# Patient Record
(demographics unavailable — no encounter records)

---

## 2017-06-20 NOTE — ED
General Adult HPI





- General


Source: patient, RN notes reviewed


Mode of arrival: wheelchair


Limitations: no limitations





<Nickolas Brown - Last Filed: 06/20/17 00:27>





<Rogers Weeks - Last Filed: 06/20/17 03:58>





- General


Chief complaint: Chest Pain


Stated complaint: Chest Pain/Leg Swelling


Time Seen by Provider: 06/20/17 00:05





- History of Present Illness


Initial comments: 





This is a 48-year-old female who presents to emergency department complaining 

of chest pain or shortness of breath since noon.  Patient states the pain 

radiates around to her back.  Patient states the pain in her chest is a 

whirling sensation.  Patient denies any diaphoresis.  Patient denies any 

nausea.  Patient states he has had an increase in edema to both legs.  Patient 

denies any calf pain.  Patient denies any lightheadedness or dizziness.  

Patient denies any patient denies numbness weakness.  Patient denies any fever 

chills or cough. (Nickolas Brown)





- Related Data


 Home Medications











 Medication  Instructions  Recorded  Confirmed


 


Butalb/Acetaminophen/Caffeine 1 cap PO 06/20/17 





[Esgic -40 Capsule]   


 


Furosemide [Lasix] 20 mg PO DAILY 06/20/17 06/20/17


 


Naproxen 500 mg PO 06/20/17 


 


OXcarbazepine [Trileptal] 300 mg PO BID 06/20/17 06/20/17


 


Sertraline [Zoloft] 50 mg PO DAILY 06/20/17 06/20/17


 


risperiDONE [RisperDAL] 1 mg PO DAILY 06/20/17 06/20/17


 


traMADol HCL [Ultram]  06/20/17 











 Allergies











Allergy/AdvReac Type Severity Reaction Status Date / Time


 


aspirin Allergy  Vomiting Verified 06/20/17 00:06


 


clarithromycin [From Biaxin] Allergy  Vomiting Verified 06/20/17 00:06


 


Penicillins Allergy  Dyspnea Verified 06/20/17 00:06














Review of Systems


ROS Other: All systems not noted in ROS Statement are negative.





<Nickolas Brown - Last Filed: 06/20/17 00:27>


ROS Other: All systems not noted in ROS Statement are negative.





<Rogers Weeks - Last Filed: 06/20/17 03:58>


ROS Statement: 


Those systems with pertinent positive or pertinent negative responses have been 

documented in the HPI.








Past Medical History


Past Medical History: Hypertension


Additional Past Medical History / Comment(s): chronic back pain, leg edema, 

scoliosis


History of Any Multi-Drug Resistant Organisms: None Reported


Past Surgical History: Orthopedic Surgery


Additional Past Surgical History / Comment(s): knee sx


Past Psychological History: Anxiety


Smoking Status: Current every day smoker


Past Alcohol Use History: Rare


Past Drug Use History: None Reported





<Nickolas Brown - Last Filed: 06/20/17 00:27>





General Exam


Limitations: no limitations





<Nickolas Brown - Last Filed: 06/20/17 00:27>





<Rogers Weeks - Last Filed: 06/20/17 03:58>





- General Exam Comments


Initial Comments: 





GENERAL:


Patient is well-developed and well-nourished.  Patient is nontoxic and well-

hydrated and is in mild distress.





ENT:


Neck is soft and supple.  No significant lymphadenopathy is noted.  Oropharynx 

is clear.  Moist mucous membranes.  Neck has full range of motion without 

eliciting any pain. 





EYES:


The sclera were anicteric and conjunctiva were pink and moist.  Extraocular 

movements were intact and pupils were equal round and reactive to light.  

Eyelids were unremarkable.





PULMONARY:


Unlabored respirations.  Good breath sounds bilaterally.  No audible rales 

rhonchi or wheezing was noted.





CARDIOVASCULAR:


There is a regular rate and rhythm without any murmurs gallops or rubs.  





ABDOMEN:


Soft and nontender with normal bowel sounds.  No palpable organomegaly was 

noted.  There is no palpable pulsatile mass.





SKIN:


Skin is clear with no lesions or rashes and otherwise unremarkable.





NEUROLOGIC:


Patient is alert and oriented x3.  Cranial nerves II through XII are grossly 

intact.  Motor and sensory are also intact.  Normal speech, volume and content.

  Symmetrical smile.  





MUSCULOSKELETAL:


Normal extremities with adequate strength and full range of motion.  1+ edema 

bilaterally





LYMPHATICS:


No significant lymphadenopathy is noted





PSYCHIATRIC:


Normal psychiatric evaluation.  Normal interpersonal interactions appears 

functionally intact in deals appropriately with others.  No signs of 

depression.  No signs of anxiety.  (Nickolas Brown)





Medical Decision Making





<Nickolas Brown - Last Filed: 06/20/17 00:27>





- Lab Data


Result diagrams: 


 06/20/17 00:30





 06/20/17 00:30





<Rogers Weeks - Last Filed: 06/20/17 03:58>





- Medical Decision Making





EKG shows a sinus bradycardia 55 bpm MO interval is 148 QRS is 90 QT intervals 

452 QTC is 432 patient's EKG shows no ST segment elevation or depression or T-

wave abdomen is noted.





Dr. Salgado taking over the care of this patient at 1 AM. (Nickolas Brown)





- Lab Data





 Lab Results











  06/20/17 06/20/17 06/20/17 Range/Units





  00:30 00:30 00:30 


 


WBC   6.0   (3.8-10.6)  k/uL


 


RBC   4.27   (3.80-5.40)  m/uL


 


Hgb   12.8   (11.4-16.0)  gm/dL


 


Hct   38.6   (34.0-46.0)  %


 


MCV   90.5   (80.0-100.0)  fL


 


MCH   30.1   (25.0-35.0)  pg


 


MCHC   33.2   (31.0-37.0)  g/dL


 


RDW   13.5   (11.5-15.5)  %


 


Plt Count   202   (150-450)  k/uL


 


Neutrophils %   48   %


 


Lymphocytes %   41   %


 


Monocytes %   5   %


 


Eosinophils %   3   %


 


Basophils %   1   %


 


Neutrophils #   2.9   (1.3-7.7)  k/uL


 


Lymphocytes #   2.5   (1.0-4.8)  k/uL


 


Monocytes #   0.3   (0-1.0)  k/uL


 


Eosinophils #   0.2   (0-0.7)  k/uL


 


Basophils #   0.1   (0-0.2)  k/uL


 


PT     (9.0-12.0)  sec


 


INR     (<1.1)  


 


APTT     (22.0-30.0)  sec


 


Sodium    124 L  (137-145)  mmol/L


 


Potassium    3.7  (3.5-5.1)  mmol/L


 


Chloride    91 L  ()  mmol/L


 


Carbon Dioxide    25  (22-30)  mmol/L


 


Anion Gap    8  mmol/L


 


BUN    10  (7-17)  mg/dL


 


Creatinine    0.60  (0.52-1.04)  mg/dL


 


Est GFR (MDRD) Af Amer    >60  (>60 ml/min/1.73 sqM)  


 


Est GFR (MDRD) Non-Af    >60  (>60 ml/min/1.73 sqM)  


 


Glucose    103 H  (74-99)  mg/dL


 


Calcium    8.6  (8.4-10.2)  mg/dL


 


Magnesium    1.7  (1.6-2.3)  mg/dL


 


Total Bilirubin    0.3  (0.2-1.3)  mg/dL


 


AST    22  (14-36)  U/L


 


ALT    32  (9-52)  U/L


 


Alkaline Phosphatase    77  ()  U/L


 


Total Creatine Kinase  196 H    ()  U/L


 


CK-MB (CK-2)  5.4 H*    (0.0-2.4)  ng/mL


 


CK-MB (CK-2) Rel Index  2.8    


 


Troponin I  <0.012    (0.000-0.034)  ng/mL


 


NT-Pro-B Natriuret Pep     pg/mL


 


Total Protein    6.0 L  (6.3-8.2)  g/dL


 


Albumin    3.8  (3.5-5.0)  g/dL














  06/20/17 06/20/17 Range/Units





  00:30 00:30 


 


WBC    (3.8-10.6)  k/uL


 


RBC    (3.80-5.40)  m/uL


 


Hgb    (11.4-16.0)  gm/dL


 


Hct    (34.0-46.0)  %


 


MCV    (80.0-100.0)  fL


 


MCH    (25.0-35.0)  pg


 


MCHC    (31.0-37.0)  g/dL


 


RDW    (11.5-15.5)  %


 


Plt Count    (150-450)  k/uL


 


Neutrophils %    %


 


Lymphocytes %    %


 


Monocytes %    %


 


Eosinophils %    %


 


Basophils %    %


 


Neutrophils #    (1.3-7.7)  k/uL


 


Lymphocytes #    (1.0-4.8)  k/uL


 


Monocytes #    (0-1.0)  k/uL


 


Eosinophils #    (0-0.7)  k/uL


 


Basophils #    (0-0.2)  k/uL


 


PT   10.2  (9.0-12.0)  sec


 


INR   1.0  (<1.1)  


 


APTT   25.0  (22.0-30.0)  sec


 


Sodium    (137-145)  mmol/L


 


Potassium    (3.5-5.1)  mmol/L


 


Chloride    ()  mmol/L


 


Carbon Dioxide    (22-30)  mmol/L


 


Anion Gap    mmol/L


 


BUN    (7-17)  mg/dL


 


Creatinine    (0.52-1.04)  mg/dL


 


Est GFR (MDRD) Af Amer    (>60 ml/min/1.73 sqM)  


 


Est GFR (MDRD) Non-Af    (>60 ml/min/1.73 sqM)  


 


Glucose    (74-99)  mg/dL


 


Calcium    (8.4-10.2)  mg/dL


 


Magnesium    (1.6-2.3)  mg/dL


 


Total Bilirubin    (0.2-1.3)  mg/dL


 


AST    (14-36)  U/L


 


ALT    (9-52)  U/L


 


Alkaline Phosphatase    ()  U/L


 


Total Creatine Kinase    ()  U/L


 


CK-MB (CK-2)    (0.0-2.4)  ng/mL


 


CK-MB (CK-2) Rel Index    


 


Troponin I    (0.000-0.034)  ng/mL


 


NT-Pro-B Natriuret Pep  292   pg/mL


 


Total Protein    (6.3-8.2)  g/dL


 


Albumin    (3.5-5.0)  g/dL














Disposition





<Nickolas Brown - Last Filed: 06/20/17 00:27>





<Rogers Weeks - Last Filed: 06/20/17 03:58>


Clinical Impression: 


 Hyponatremia





Disposition: ADMITTED AS IP TO THIS HOSP


Condition: Fair


Referrals: 


Sam Miner MD [Primary Care Provider] - 1-2 days

## 2017-06-20 NOTE — XR
PROCEDURE: FILM CXR 2 VIEWS

 

HISTORY: 48-year-old female with chest pain.

 

COMPARISON: None

 

TECHNIQUE: Frontal and lateral views of the chest were obtained.

 

FINDINGS: 

 

Limited by technique.

 

Cardiomediastinal silhouette is within normal limits.

 

No evidence of focal consolidation, pleural effusion, or pneumothorax.

 

Bones are unremarkable for age.

 

IMPRESSION:

 

Unremarkable two-view chest.

## 2017-06-20 NOTE — HP
DATE OF ADMISSION:  



A 48-year-old female came into emergency department with complaints of 

nonspecific chest pain on and off, sharp in nature. Appears to be 

associated with anxiety and chest pain radiates around her back. 

Troponins are negative. EKGs negative. Patient's chest pain is 

nonpleuritic in nature. Patient attributes the chest pain to her leg 

pain. Whenever she has leg pain she gets anxious and she starts having 

this pain. Patient is also on naproxen. Although it is not related to 

food, it appears gastroesophageal reflux appears to be contributing to 

that pain. The patient does not have any family history of premature 

coronary artery disease. Patient was ruled out acute coronary 

syndrome, so will need a stress test regarding that.  Patient was 

subsequently admitted here because of hyponatremia and patient is on 

Lasix and patient is taking Lasix for bilateral lower limb edema. That 

led hyponatremia. Unfortunately she got IV Lasix as well from ER. The 

patient was given IV fluids. I am expecting her hyponatremia to 

improve and if her hyponatremia improved patient will be discharged 

today. Patient will need an outpatient stress test and patient will be 

given Prilosec as well and patient will follow with Dr. Miner as 

outpatient.  



REVIEW OF SYSTEMS: 

CONSTITUTIONAL: No fever, no malaise, no fatigue. 

HEENT: No recent visual problems or hearing problems. Denied any sore 

throat.  

CARDIOVASCULAR: As described HPI. 

PULMONARY: No shortness of breath, no cough, no hemoptysis. 

GASTROINTESTINAL: No diarrhea, no nausea, no vomiting, no abdominal 

pain. Normoactive bowel sounds.  

NEUROLOGICAL: No headaches, no weakness, no numbness. 

HEMATOLOGICAL: Denies any bleeding or petechiae. 

GENITOURINARY: Denies any burning micturition, frequency, or urgency. 

MUSCULOSKELETAL/RHEUMATOLOGICAL: Denies any joint pain, swelling, or 

any muscle pain.  

ENDOCRINE: Denies any polyuria or polydipsia. 



The rest of the 14 point review of systems is negative. 



Patient denied any significant shortness of breath, orthopnea, PND, 

cough.  



PAST MEDICAL HISTORY: Significant for hypertension, depression, 

anxiety. The patient does smoke a half pack per day. Denied any 

alcohol abuse or any drug abuse.  



FAMILY HISTORY: As described in HPI. 



PHYSICAL EXAMINATION: Temperature 97.7, pulse 65, respiratory rate of 

16, blood pressure is 130/56, saturating at 96% on room air.  

GENERAL: The patient is alert and oriented x3, not in any acute 

distress. Well developed, well nourished.  

HEENT: Pupils are round and equally reacting to light. EOMI. No 

scleral icterus. No conjunctival pallor. Normocephalic, atraumatic. No 

pharyngeal erythema. No thyromegaly.  

CARDIOVASCULAR: S1 and S2 present. No murmurs, rubs, or gallops. 

PULMONARY: Chest is clear to auscultation, no wheezing or crackles. 

ABDOMEN: Soft, nontender, nondistended, normoactive bowel sounds. No 

palpable organomegaly.  

MUSCULOSKELETAL: No joint swelling or deformity. 

EXTREMITIES: No cyanosis, clubbing, or pedal edema. 

NEUROLOGICAL: Gross neurological examination did not reveal any focal 

deficits.  

SKIN: No rashes. 



LABORATORY DATA: CBC, CMP are abnormal for low sodium 124. Chest x-ray 

did not show any pneumonic infiltrate.  



ASSESSMENT AND PLAN: 

1. Chest pain, ruled out acute coronary syndrome, related to either 

anxiety disorder or gastroesophageal disease. Management as mentioned 

above.  

2. Hypertension. 

3. Hyponatremia related to her Lasix, which will be discontinued and 

patient will be asked to use compression socks for that.  

4. Depression, anxiety disorder. Patient is already on antidepressants 

at this point of time. Further management as outpatient.  



The patient will be discharged after Doppler of the bilateral lower 

extremities if it is negative and if patient's sodium is greater than 

130, patient will be discharged. Will discontinue Lasix upon discharge 

and patient will be followed by Dr. Miner in 3 to 7 days and patient 

will need outpatient stress test.  Discharge diet is regular. Activity 

as tolerated.  



This dictation is both H&P and discharge summary.

## 2017-06-20 NOTE — US
EXAMINATION TYPE: US venous doppler duplex LE BI

 

DATE OF EXAM: 6/20/2017 3:12 PM

 

COMPARISON: NONE

 

CLINICAL HISTORY: R/O DVT. Pain and edema bilateral legs

 

SIDE PERFORMED: Bilateral  

 

TECHNIQUE:  The lower extremity deep venous system is examined utilizing real time linear array sonog
britt with graded compression, doppler sonography and color-flow sonography.

 

VESSELS IMAGED:

External Iliac Vein (EIV)

Common Femoral Vein

Deep Femoral Vein

Greater Saphenous Vein *

Femoral Vein

Popliteal Vein

Small Saphenous Vein *

Proximal Calf Veins

(* superficial vessels)

 

Right Leg:  No evidence of DVT

 

Left Leg:  No evidence of DVT

 

No popliteal fossa lesion is seen.

 

IMPRESSION:

 

THIS EXAMINATION IS NEGATIVE FOR DVT WITHIN BOTH LEGS.

## 2017-08-24 NOTE — ED
Eye Problem HPI





- General


Chief complaint: Eye Problems


Stated complaint: Eye Problem


Time Seen by Provider: 08/24/17 20:29


Source: patient


Mode of arrival: ambulatory


Limitations: no limitations





- History of Present Illness


Initial comments: 


40-year-old female patient presented to emergency department today for 

complaints of left eye pain, drainage, and swelling.  Patient states that about 

one week ago she began to have some eye discomfort and drainage from the eye.  

Patient states that over the next few days it became more swollen.  She did see 

her primary care physician 4 days ago was given a prescription for 

sulfacetamide for conjunctivitis and azithromycin for sinusitis.  Patient 

states that she has been taking both antibiotics as directed and her symptoms 

seem to be worsening.  Patient states that a couple days after her doctor's 

appointment she did develop some red bumps on her forehead, she states that 

they were itchy, states that they have turned into scabbed lesions.  She states 

this all has developed over her lower eyelid as well.  Patient states she does 

have pain with eye movement.  States she does have headache with this.  She 

reports blurred vision.  She denies any fever, chills, ear pain, dizziness, 

weakness, or any other physical concerns.








- Related Data


 Home Medications











 Medication  Instructions  Recorded  Confirmed


 


Butalb/Acetaminophen/Caffeine 1 cap PO Q6H PRN 06/20/17 08/24/17





[Esgic -40 Capsule]   


 


LORazepam [Ativan] 0.5 mg PO DAILY PRN 06/20/17 08/24/17


 


Naproxen 500 mg PO BID PRN 06/20/17 08/24/17


 


Sertraline [Zoloft] 50 mg PO DAILY 06/20/17 08/24/17


 


risperiDONE [RisperDAL] 1 mg PO HS 06/20/17 08/24/17


 


traMADol HCL [Ultram] 50 mg PO TID PRN 06/20/17 08/24/17








 Previous Rx's











 Medication  Instructions  Recorded


 


valACYclovir [Valtrex] 500 mg PO BID #20 tab 08/24/17











 Allergies











Allergy/AdvReac Type Severity Reaction Status Date / Time


 


aspirin Allergy  Vomiting Verified 08/24/17 20:42


 


clarithromycin [From Biaxin] Allergy  Vomiting Verified 08/24/17 20:42


 


omeprazole Allergy  Unknown Verified 08/24/17 20:42


 


Penicillins Allergy  Dyspnea Verified 08/24/17 20:42














Review of Systems


ROS Statement: 


Those systems with pertinent positive or pertinent negative responses have been 

documented in the HPI.





ROS Other: All systems not noted in ROS Statement are negative.





Past Medical History


Past Medical History: Hypertension


Additional Past Medical History / Comment(s): chronic back pain, leg edema, 

scoliosis, anxiety depression


History of Any Multi-Drug Resistant Organisms: None Reported


Past Surgical History: Orthopedic Surgery


Additional Past Surgical History / Comment(s): knee sx


Past Anesthesia/Blood Transfusion Reactions: No Reported Reaction


Past Psychological History: Anxiety, Depression


Smoking Status: Current every day smoker


Past Alcohol Use History: None Reported


Past Drug Use History: None Reported





General Exam


Limitations: no limitations


General appearance: alert, in no apparent distress


Eye exam: Present: normal appearance, PERRL, EOMI, periorbital swelling (

Periorbital swelling extending down over the left maxillary area.  Left lower 

lid is edematous, does exhibit two small scabbed lesions near the inner 

canthus.  Patient also exhibits 3 scabbed lesions over the medial forehead.  

There is green drainage noted from the left eye.  ), other (Eye was prepped 

with proparacaine.  Fluorescein stain with Wood's lamp examination to the left 

eye shows no evidence of corneal abrasion, dendritic lesions, or laceration.).  

Absent: scleral icterus, conjunctival injection, periorbital tenderness


Pupils: Present: normal accommodation


ENT exam: Present: normal exam, normal oropharynx, mucous membranes moist, TM's 

normal bilaterally


Neck exam: Present: normal inspection.  Absent: tenderness, meningismus, 

lymphadenopathy


Respiratory exam: Present: normal lung sounds bilaterally.  Absent: respiratory 

distress, wheezes, rales, rhonchi, stridor


Cardiovascular Exam: Present: regular rate, normal rhythm, normal heart sounds.

  Absent: systolic murmur, diastolic murmur, rubs, gallop, clicks


Neurological exam: Present: alert, oriented X3, CN II-XII intact


Psychiatric exam: Present: normal affect, normal mood


Skin exam: Present: warm, dry, intact, normal color.  Absent: rash





Course


 Vital Signs











  08/24/17 08/24/17





  19:52 21:25


 


Temperature 98.6 F 97.9 F


 


Pulse Rate 65 64


 


Respiratory 17 16





Rate  


 


Blood Pressure 133/72 143/79


 


O2 Sat by Pulse 99 98





Oximetry  














Medical Decision Making





- Medical Decision Making


40-year-old male patient presented for evaluation of left eye discomfort and 

swelling.  Patient has been instilling sulfacetamide drops without relief of 

symptoms.  Fluorescein stain with Wood's lamp examination was performed and 

showed no abrasion, laceration, or dendritic lesions.  My attending Dr. Castro 

was in to evaluate the eye as well and is concerned for herpes simplex 

infection.  He did speak to Dr. Valiente the ophthalmologist who agreed to see 

patient in the office tomorrow. He recommended to start patient on Valtrex. 

Patient will be given a prescription for this as well as follow up information 

with Dr. Valiente.  She was also instructed to follow up with her primary care 

physician for recheck in 1-2 days.  Instructed to return here immediately for 

any new, worsening, or concerning symptoms.  Patient verbalizes understanding 

and agrees with this plan.








Disposition


Clinical Impression: 


 Ocular herpes simplex





Disposition: HOME SELF-CARE


Condition: Good


Instructions:  Eye Pain (ED)


Additional Instructions: 


Continue azithromycin for sinusitis.  Call Dr. Valiente's office tomorrow for an 

appointment.  Complete the Valtrex prescription in full.  Return to the 

emergency department immediately for any new, worsening, or concerning symptoms.


Prescriptions: 


valACYclovir [Valtrex] 500 mg PO BID #20 tab


Referrals: 


Sam Miner MD [Primary Care Provider] - 1-2 days


Ti Valiente MD [STAFF PHYSICIAN] - 1-2 days


Time of Disposition: 21:18

## 2017-10-12 NOTE — MR
EXAMINATION TYPE: MR lumbar spine wo con

 

DATE OF EXAM: 10/12/2017

 

COMPARISON: NONE

 

HISTORY: low back pain

 

TECHNIQUE: T1 and T2  axial and sagittal images of the lumbar spine are submitted.  

 

FINDINGS: There is no abnormal signal seen within the visualized spinal cord or paraspinal soft tissu
es. Degenerative disc disease L2-3, L3-4, and L4-L5.

 

At L1-2 there is no disc herniation, canal stenosis, or foraminal encroachment.

 

At L2-3 there is mild degenerative disc disease. There is mild facet arthropathy with no foraminal en
croachment or canal stenosis. No disc herniation.

 

At L3-4 there is broad-based central disc protrusion with mild effacement of thecal sac. Facet arthro
charissa and ligament flavum hypertrophy contribute to borderline canal stenosis.

 

At L4-5 there is annular tear with focal small central disc herniation resulting in moderate compress
ion of thecal sac. Facet arthropathy and ligamentum flavum hypertrophy contribute to mild canal steno
sis. Neural foramina remain patent.

 

At L5-S1 there is no disc herniation or canal stenosis. No foraminal encroachment. There is facet art
hropathy.

 

 

IMPRESSION:

1. Annular tear and broad-based disc herniations L3-L4 and L4-L5. Hypertrophic change of the facets a
nd ligamentum flavum at these levels contribute to borderline to mild canal stenosis. 2 multilevel fa
cet arthropathy and degenerative disc disease.

## 2017-10-12 NOTE — MR
EXAMINATION TYPE: MR brain wo

 

DATE OF EXAM: 10/12/2017

 

COMPARISON: 4/9/2012

 

HISTORY: Neck pain, migraine

 

T1-weighted sagittal, T2, FLAIR, and diffusion axial, and T2 coronal coronal views of the brain are s
ubmitted.  

 

There is no evidence of acute ischemia.  The ventricles, basal cisterns, and sulci overlying the conv
exities are consistent with the patient's age.  There is no mass effect.  

 

Craniocervical junction maintained. Sella turcica has a normal appearance.

 

No cerebellopontine angle mass. Changes of chronic sinusitis noted.

 

Abnormal signal involving the cortney stable suggestive of previous ischemia or demyelination. There is 
a cyst within the posterior nasopharynx.

 

WHITE MATTER:

There is diffuse confluent and numerous bilateral focal areas of abnormal signal throughout the white
 matter totaling greater than 50. Sagittal imaging suggestive evidence of callosal lesion.

 

 

IMPRESSION:

1. Progression of diffuse and extensive white matter changes. Differential diagnosis would include de
myelinating disease such as MS. Other etiologies including remote microvascular ischemia, vasculitis,
 sarcoidosis, Lyme's disease, hypertension or migraine headaches in the differential diagnosis.

 

 

 

 

EXAMINATION TYPE: MR cervical spine wo

 

DATE OF EXAM: 10/12/2017

 

COMPARISON: NONE

 

HISTORY: Neck pain, migraine

 

TECHNIQUE: T1 sagittal and coronal, T2 sagittal, and gradient echo axial views of the cervical spine 
are submitted.

 

FINDINGS: The cranial cervical junction is preserved.  There is no abnormal signal seen within the sp
inal cord or paraspinal soft tissues.

 

At C2-3 there is mild uncovertebral joint hypertrophy on the left. No disc herniation or canal stenos
is. No foraminal encroachment greater

 

At C3-4 there is bilateral uncovertebral joint hypertrophy greater on the left with left-sided facet 
arthropathy and mild left foraminal encroachment. No disc herniation or canal stenosis.

 

At C4-5 there is bilateral facet arthropathy and mild uncovertebral joint hypertrophy with mild bilat
eral foraminal encroachment. No disc herniation or canal stenosis.

 

At C5-6 there is mild facet arthropathy. No foraminal encroachment, disc herniation or canal stenosis
. Minimal uncovertebral joint hypertrophy bilaterally. 

 

At C6-7 there is focal broad-based central disc herniation slightly greater paracentrally the right. 
Abuts the anterior margin the spinal cord. Mild bilateral foraminal encroachment.

 

At C7-T1 there is no disc herniation or canal stenosis. No foraminal encroachment.

 

Multiple thyroid nodules are incidentally noted.

 

 

IMPRESSION: 

1.  Multilevel cervical spondylosis with multilevel foraminal encroachment as discussed above.

2. Broad-based central disc herniation C6-C7 which abuts the anterior margin the spinal cord slightly
 greater paracentrally the right.

3. Multiple thyroid nodules.

## 2018-01-25 NOTE — US
EXAMINATION TYPE: US carotid duplex BILAT

 

DATE OF EXAM: 1/25/2018

 

COMPARISON: NONE

 

CLINICAL HISTORY: I63.9 stroke, R42 dizziness. Dizziness, stroke

 

EXAM MEASUREMENTS: 

 

RIGHT:  Peak Systolic Velocity (PSV) cm/sec

----- Right CCA:  91.7  

----- Right ICA:  130.1     

----- Right ECA:  116.4   

ICA/CCA ratio:  1.4    

 

RIGHT:  End Diastole cm/sec

----- Right CCA:  34.9   

----- Right ICA:  50.3      

----- Right ECA:  16.0     

 

LEFT:  Peak Systolic Velocity (PSV) cm/sec

----- Left CCA:  107.7  

----- Left ICA:  117.8   

----- Left ECA:  99.5  

ICA/CCA ratio:  1.1  

 

LEFT:  End Diastole cm/sec

----- Left CCA:  38.2  

----- Left ICA:  44.5   

----- Left ECA:  19.2 

 

VERTEBRALS (direction of flow):

Right Vertebral: Antegrade

Left Vertebral: Antegrade

 

Rhythm:  Normal

 

Elevated right internal carotid artery velocity in the range of 50-69%. The ratio suggests this may b
e slightly lower.

 

** Right thyroid: 1.5 x 0.8 x 1.2cm nodule 

 

IMPRESSION:  

1. Elevation of the right internal carotid artery velocity compatible with a moderate 50-69% stenosis
.

2. Incidental note is made of a right thyroid lobe nodule. Consider ultrasound of the thyroid for add
itional evaluation.   

 

 

Criteria for Assigning % of Stenosis / Diameter reduction

(Estimation based on the indirect measurements of the internal carotid artery velocities (ICA PSV).

1.  Normal (no stenosis)=ICA PSV < 125 cm/s: ratio < 2.0: ICA EDV<40 cm/s.

2. Less than 50% stenosis=ICA PSV < 125 cm/s: ratio < 2.0: ICA EDV<40 cm/s.

3.  50 to 69% stenosis=ICA PSV of 125 to 230 cm/s: ratio 2.0 ? 4.0: ICA EDV  cm/s.

4.  Greater than 70% stenosis to near occlusion= ICA PSV > 230 cm/s: ratio > 4.0: ICA EDV > 100 cm/s.
 

5.  Near occlusion= ICA PSV velocities may be low or undetectable: variable ratio and ICA EDV.

6.  Total occlusion=unable to detect flow.

## 2018-02-20 NOTE — US
EXAMINATION TYPE: US thyroid st tissue head/neck

 

DATE OF EXAM: 2/20/2018

 

COMPARISON: NONE

 

CLINICAL HISTORY: 48-year-old female E04.1 Thyroid Nodule. Right side nodule visualized on carotid ul
trasound 

 

TECHNIQUE: Multiple sonographic images of the thyroid gland are obtained.

 

FINDINGS:

 

GLAND SIZE:

 

Right Lobe: 5.2 x 1.8 x 1.8 cm

** Overall Parenchyma:  homogenous

Left Lobe: 5.2 x 1.3 x 1.9 cm

** Overall Parenchyma:  homogeneous

Isthmus Thickness:  0.3 cm

 

NODULES

 

RIGHT:   # of nodules measured on right: 2

1.  1.9 X 0.7  x 1.2 cm hypoechoic solid nodule at the lower pole with well-defined margins; .  This 
nodule is wider than tall and shows intranodular vascularity.

** Prior size: No previous 

2. 6 x 5 x 5 mm hypoechoic mixed nodule at the lower pole with well-defined margins; .  This nodule i
s wider than tall and shows intranodular vascularity.

 ** Prior size: No previous  

 

 

LEFT:    # of nodules measured on left:  2

1.  0.8 X 0.5  x 0.8 cm hypoechoic solid nodule at the mid pole with well-defined margins; .  This no
dule is wider than tall and shows intranodular vascularity.

** Prior size: No previous 

2. 0.9 X 0.4  x 0.8 cm hypoechoic solid nodule at the upper pole with well-defined margins; .  This n
odule is wider than tall and shows intranodular vascularity.

 ** Prior size: No previous 

 

 

ISTHMUS:    # of nodules measured in the isthmus:  0

 

 

Bilateral neck scanned, no evidence of lymphadenopathy.

 

 

 

 

 

IMPRESSION:

 

1. Mild thyromegaly.

2. Two nodules in each lobe. Dominant nodule is on the right measuring 1.9 cm. Other nodules measure 
9 mm or smaller. FNA can be considered of the dominant nodule.

## 2018-04-09 NOTE — US
EXAMINATION TYPE: US thyroid st tissue head/neck

 

DATE OF EXAM: 4/9/2018

 

COMPARISON: 2/20/2018

 

CLINICAL HISTORY: R22.0 Localized swelling, mass and lump, head. f/u exam

 

GLAND SIZE:

 

Right Lobe: 5.0 x 1.7 x 2.1 cm

** Overall Parenchyma:  homogenous

Left Lobe: 5.5 x 2.1 x 1.5 cm

** Overall Parenchyma:  homogeneous

Isthmus Thickness:  0.4 cm

 

NODULES

 

RIGHT:   # of nodules measured on right: 3

1.  1.4 X 1.3  x 0.7 cm hypoechoic solid nodule at the mid pole with well-defined margins.  This nodu
le is wider than tall and shows intranodular vascularity.

** Prior size: 1.9 x 1.2  x 0.7 cm - previous tech combined #1 and #2 lesion measurements together

2. 0.7 X 0.5  x 0.4 cm hypoechoic solid nodule at the mid pole with well-defined margins.  This nodul
e is wider than tall and shows intranodular vascularity.

 ** Prior size:  as stated under nodule #1

3. 0.7 X 0.6  x 0.6 cm hypoechoic solid nodule at the lower pole with well-defined margins.  This nod
ule is wider than tall and shows intranodular vascularity.

** Prior size:  0.6 x 0.5  x 0.6 cm 

 

 

LEFT:    # of nodules measured on left:  2

1.  0.9 X 1.0  x 0.4 cm hypoechoic solid nodule at the mid pole with well-defined margins.  This nodu
le is wider than tall and shows intranodular vascularity.

** Prior size: 0.8 x 0.8  x 0.5 cm 

2. 1.0 X 0.7  x 0.4 cm hypoechoic solid nodule at the mid pole with well-defined margins.  This nodul
e is wider than tall and shows intranodular vascularity.

 ** Prior size:  0.9 x 0.8  x 0.4 cm 

 

 

ISTHMUS:    # of nodules measured in the isthmus:  0

 

Bilateral neck scanned, no evidence of lymphadenopathy.

 

 

 

 

 

IMPRESSION:

Nonspecific thyroid nodularity as outlined above.

## 2018-06-15 NOTE — ECHOF
Referral Reason:R60.0 Localized edema, I10 Hypertension



MEASUREMENTS

--------

HEIGHT: 137.2 cm

WEIGHT: 67.1 kg

BP: 

IVSd:   0.8 cm     (0.6 - 1.1)

LVIDd:   4.3 cm     (3.9 - 5.3)

LVPWd:   0.9 cm     (0.6 - 1.1)

IVSs:   1.1 cm

LVIDs:   3.0 cm

LVPWs:   1.3 cm

LA Diam:   2.4 cm     (2.7 - 3.8)

Ao Diam:   2.4 cm     (2.0 - 3.7)

AV Cusp:   1.6 cm     (1.5 - 2.6)

LA Diam:   3.0 cm     (2.7 - 3.8)

MV EXCURSION:   12.408 mm     (> 18.000)

MV EF SLOPE:   60 mm/s     (70 - 150)

EPSS:   0.4 cm

MV E Aman:   0.77 m/s

MV DecT:   186 ms

MV A Aman:   0.71 m/s

MV E/A Ratio:   1.08 

RAP:   5.00 mmHg

RVSP:   12.43 mmHg







FINDINGS

--------

Sinus rhythm.

This was a technically adequate study.

LV size, wall thickness and systolic function are normal, with an EF greater than 55%.   The left robbi
tricular size is normal.

The right ventricle is normal in size.

The left atrial size is normal.

The right atrial size is normal.

The aortic valve is trileaflet, and appears structurally normal. No aortic stenosis or regurgitation.


Mild mitral annular calcification present.   Mild mitral regurgitation is present.

Mild tricuspid regurgitation present.   There is no evidence of pulmonary hypertension.   The right v
entricular systolic pressure, as measured by Doppler, is 12.43mmHg.

There is no pulmonic regurgitation present.

The aortic root size is normal.

There is no pericardial effusion.



CONCLUSIONS

--------

1. LV size, wall thickness and systolic function are normal, with an EF greater than 55%.

2. The left ventricular size is normal.

3. The right ventricle is normal in size.

4. The left atrial size is normal.

5. The right atrial size is normal.

6. The aortic valve is trileaflet, and appears structurally normal. No aortic stenosis or regurgitati
on.

7. Mild mitral annular calcification present.

8. Mild mitral regurgitation is present.

9. Mild tricuspid regurgitation present.

10. There is no evidence of pulmonary hypertension.

11. The right ventricular systolic pressure, as measured by Doppler, is 12.43mmHg.

12. There is no pulmonic regurgitation present.

13. The aortic root size is normal.

14. There is no pericardial effusion.





SONOGRAPHER: Susan Francis RDCS